# Patient Record
Sex: MALE | Race: WHITE | Employment: FULL TIME | URBAN - METROPOLITAN AREA
[De-identification: names, ages, dates, MRNs, and addresses within clinical notes are randomized per-mention and may not be internally consistent; named-entity substitution may affect disease eponyms.]

---

## 2017-01-19 RX ORDER — VALSARTAN AND HYDROCHLOROTHIAZIDE 80; 12.5 MG/1; MG/1
TABLET, FILM COATED ORAL
Qty: 90 TABLET | Refills: 0 | OUTPATIENT
Start: 2017-01-19

## 2017-01-19 NOTE — TELEPHONE ENCOUNTER
"Refill available at pharmacy.  Request refused as \"duplicate\" sent back to pharmacy.      Agustina Trejo RN        "

## 2017-01-19 NOTE — TELEPHONE ENCOUNTER
Janusz        Last Written Prescription Date: 12/19/16  Last Fill Quantity: 90, # refills: 0  [place] with [facility] or [facility] prescribing provider: 7/11/16       POTASSIUM   Date Value Ref Range Status   07/18/2016 3.7 3.4 - 5.3 mmol/L Final     CREATININE   Date Value Ref Range Status   07/11/2016 0.95 0.66 - 1.25 mg/dL Final     BP Readings from Last 3 Encounters:   07/19/16 134/84   07/15/16 142/80   07/12/16 128/78

## 2017-02-17 DIAGNOSIS — I10 HTN, GOAL BELOW 140/90: ICD-10-CM

## 2017-02-20 RX ORDER — VALSARTAN AND HYDROCHLOROTHIAZIDE 80; 12.5 MG/1; MG/1
TABLET, FILM COATED ORAL
Qty: 30 TABLET | Refills: 0 | Status: SHIPPED | OUTPATIENT
Start: 2017-02-20 | End: 2017-03-23

## 2017-02-20 NOTE — TELEPHONE ENCOUNTER
Routing refill request to provider for review/approval because:  Drug interaction warning    Sandie Moore RN  [facility]  [phone]

## 2017-03-23 DIAGNOSIS — I10 HTN, GOAL BELOW 140/90: ICD-10-CM

## 2017-03-23 RX ORDER — VALSARTAN AND HYDROCHLOROTHIAZIDE 80; 12.5 MG/1; MG/1
TABLET, FILM COATED ORAL
Qty: 30 TABLET | Refills: 0 | Status: SHIPPED | OUTPATIENT
Start: 2017-03-23 | End: 2017-04-22

## 2017-03-23 NOTE — TELEPHONE ENCOUNTER
Routing refill request to provider for review/approval because:  Drug interaction warning- patietn no showed his last bp appointment 3/10/17          diovan HCT      Last Written Prescription Date: 02/20/17  Last Fill Quantity: 30, # refills: 0  Last Office Visit with G, P or [facility] prescribing provider: 07/11/16       Potassium   Date Value Ref Range Status   07/18/2016 3.7 3.4 - 5.3 mmol/L Final     Creatinine   Date Value Ref Range Status   07/11/2016 0.95 0.66 - 1.25 mg/dL Final     BP Readings from Last 3 Encounters:   07/19/16 134/84   07/15/16 142/80   07/12/16 128/78

## 2017-03-23 NOTE — TELEPHONE ENCOUNTER
Routing to team to inform and assist in scheduling.   Sylvia Zelaya RN   [facility] - Triage

## 2017-04-22 DIAGNOSIS — I10 HTN, GOAL BELOW 140/90: ICD-10-CM

## 2017-04-24 RX ORDER — VALSARTAN AND HYDROCHLOROTHIAZIDE 80; 12.5 MG/1; MG/1
TABLET, FILM COATED ORAL
Qty: 30 TABLET | Refills: 3 | Status: SHIPPED | OUTPATIENT
Start: 2017-04-24

## 2017-04-24 NOTE — TELEPHONE ENCOUNTER
VALSARTAN-HCTZ 80-12.5 MG TAB      Last Written Prescription Date: 3/23/17  Last Fill Quantity: 30, # refills: 0  Last Office Visit with G, P or [facility] prescribing provider: 7/11/16       Potassium   Date Value Ref Range Status   07/18/2016 3.7 3.4 - 5.3 mmol/L Final     Creatinine   Date Value Ref Range Status   07/11/2016 0.95 0.66 - 1.25 mg/dL Final     BP Readings from Last 3 Encounters:   07/19/16 134/84   07/15/16 142/80   07/12/16 128/78       Sarah Severson, CMA

## 2017-04-24 NOTE — TELEPHONE ENCOUNTER
Refill approved through [facility].  Sandie Moore RN  [facility]  [phone]

## 2017-07-18 ENCOUNTER — PRE VISIT (OUTPATIENT)
Dept: CARDIOLOGY | Facility: CLINIC | Age: 57
End: 2017-07-18

## 2017-08-24 ENCOUNTER — PRE VISIT (OUTPATIENT)
Dept: CARDIOLOGY | Facility: CLINIC | Age: 57
End: 2017-08-24

## 2017-08-28 ENCOUNTER — OFFICE VISIT (OUTPATIENT)
Dept: CARDIOLOGY | Facility: CLINIC | Age: 57
End: 2017-08-28
Payer: COMMERCIAL

## 2017-08-28 ENCOUNTER — CARE COORDINATION (OUTPATIENT)
Dept: CARDIOLOGY | Facility: CLINIC | Age: 57
End: 2017-08-28

## 2017-08-28 VITALS
DIASTOLIC BLOOD PRESSURE: 102 MMHG | HEART RATE: 72 BPM | BODY MASS INDEX: 26.31 KG/M2 | WEIGHT: 183.8 LBS | SYSTOLIC BLOOD PRESSURE: 164 MMHG | HEIGHT: 70 IN

## 2017-08-28 DIAGNOSIS — I44.7 LEFT BUNDLE BRANCH BLOCK (LBBB): Primary | ICD-10-CM

## 2017-08-28 DIAGNOSIS — Z91.148 NONCOMPLIANCE WITH MEDICATION REGIMEN: ICD-10-CM

## 2017-08-28 DIAGNOSIS — I10 HTN, GOAL BELOW 140/90: Primary | ICD-10-CM

## 2017-08-28 PROCEDURE — 99214 OFFICE O/P EST MOD 30 MIN: CPT | Performed by: INTERNAL MEDICINE

## 2017-08-28 RX ORDER — IBUPROFEN 200 MG
200 TABLET ORAL PRN
COMMUNITY

## 2017-08-28 NOTE — PROGRESS NOTES
HPI and Plan:   See dictation    No orders of the defined types were placed in this encounter.      Orders Placed This Encounter   Medications     ibuprofen (ADVIL) 200 MG tablet     Sig: Take 200 mg by mouth as needed for mild pain     Acetaminophen (TYLENOL PO)     Sig: Take by mouth as needed for mild pain or fever     Fexofenadine HCl (ALLEGRA ALLERGY PO)     Sig: Take 180 mg by mouth as needed for allergies       No diagnosis found.    CURRENT MEDICATIONS:  Current Outpatient Prescriptions   Medication Sig Dispense Refill     ibuprofen (ADVIL) 200 MG tablet Take 200 mg by mouth as needed for mild pain       Acetaminophen (TYLENOL PO) Take by mouth as needed for mild pain or fever       Fexofenadine HCl (ALLEGRA ALLERGY PO) Take 180 mg by mouth as needed for allergies       calcium carbonate (TUMS) 500 MG chewable tablet Take 2 chew tab by mouth daily as needed for heartburn       glucosamine-chondroitin 500-400 MG CAPS Take 2 capsules by mouth daily       NONFORMULARY Take 1 tablet by mouth 2 times daily LIGAPLEX       valsartan-hydrochlorothiazide (DIOVAN-HCT) 80-12.5 MG per tablet TAKE 1 TABLET BY MOUTH DAILY 30 tablet 3     amLODIPine (NORVASC) 10 MG tablet TAKE ONE TABLET BY MOUTH ONE TIME DAILY 90 tablet 3       ALLERGIES     Allergies   Allergen Reactions     Aspirin Hives     Red Dye Hives     Vicodin [Hydrocodone-Acetaminophen] GI Disturbance       PAST MEDICAL HISTORY:  Past Medical History:   Diagnosis Date     Cardiomyopathy (H)      DM (diabetes mellitus) (H)      Hypercholesteremia      Hyperlipidemia      Hypertension      Left bundle branch block      Unspecified sinusitis (chronic)      Urinary calculus, unspecified     Renal stones       PAST SURGICAL HISTORY:  Past Surgical History:   Procedure Laterality Date     DECOMPRESSION, FUSION CERVICAL ANTERIOR ONE LEVEL, COMBINED N/A 7/18/2016    Procedure: COMBINED DECOMPRESSION, FUSION CERVICAL ANTERIOR ONE LEVEL;  Surgeon: Marshall Almonte,  MD;  Location: [facility] OR     HC NASAL/SINUS SCOPE W THER INSTILL  2004     HC TYMPANOPLASTY W/O MASTOID, INIT/REV W/O OSS CHAIN RECONST      Kemal naso plastic surgery     OTHER SURGICAL HISTORY  2008    sinus       FAMILY HISTORY:  Family History   Problem Relation Age of Onset     Hypertension Mother      Cardiovascular Mother      MI after 49 yo-CAD     DIABETES Father      Hypertension Father      CANCER Father      lung cancer       SOCIAL HISTORY:  Social History     Social History     Marital status:      Spouse name: Mihaela     Number of children: 1     Years of education: N/A     Occupational History     sales      Social History Main Topics     Smoking status: Current Every Day Smoker     Packs/day: 0.50     Years: 20.00     Types: Cigarettes     Smokeless tobacco: Never Used      Comment: 1/2 pack daily      Alcohol use No     Drug use: No      Comment: used marijuana     Sexual activity: Yes     Partners: Female     Birth control/ protection: Surgical     Other Topics Concern      Service No     Blood Transfusions No     Caffeine Concern No     Occupational Exposure No     Hobby Hazards No     Sleep Concern No     Stress Concern No     Weight Concern No     Special Diet Yes     low carbs, low sugar      Back Care No     Exercise No     not since neck injury (last 6 weeks) normally active hard labor working, walking, biking, golfing      Bike Helmet No     Seat Belt Yes     Self-Exams No     Social History Narrative       Review of Systems:  Skin:  Negative     Eyes:  Negative    ENT:  Negative    Respiratory:  Negative    Cardiovascular:       Gastroenterology: Negative    Genitourinary:  Negative    Musculoskeletal:  Negative    Neurologic:  Negative    Psychiatric:  Negative    Heme/Lymph/Imm:  Positive for allergies  Endocrine:  Positive for diabetes    Physical Exam:  Vitals: BP (!) 164/102 (BP Location: Right arm, Patient Position: Sitting, Cuff Size: Adult Regular)  Pulse 72  Ht 1.778 m  "(5' 10\")  Wt 83.4 kg (183 lb 12.8 oz)  BMI 26.37 kg/m2    Constitutional:  cooperative, alert and oriented, well developed, well nourished, in no acute distress        Skin:  warm and dry to the touch, no apparent skin lesions or masses noted        Head:  normocephalic, no masses or lesions        Eyes:  pupils equal and round, conjunctivae and lids unremarkable, sclera white, no xanthalasma, EOMS intact, no nystagmus        ENT:  no pallor or cyanosis, dentition good        Neck:  carotid pulses are full and equal bilaterally, JVP normal, no carotid bruit, no thyromegaly        Chest:  normal breath sounds, clear to auscultation, normal A-P diameter, normal symmetry, normal respiratory excursion, no use of accessory muscles        Cardiac: regular rhythm, normal S1/S2, no S3 or S4, apical impulse not displaced, no murmurs, gallops or rubs                  Abdomen:  abdomen soft, non-tender, BS normoactive, no mass, no HSM, no bruits        Vascular: pulses full and equal, no bruits auscultated                                      Extremities and Back:  no deformities, clubbing, cyanosis, erythema observed        Neurological:  affect appropriate, oriented to time, person and place          Recent Lab Results:  LIPID RESULTS:  Lab Results   Component Value Date    CHOL 137 05/17/2016    HDL 43 05/17/2016    LDL 71 05/17/2016    TRIG 114 05/17/2016    CHOLHDLRATIO 4.2 12/31/2014       LIVER ENZYME RESULTS:  Lab Results   Component Value Date    AST 14 05/17/2016    ALT 29 05/17/2016       CBC RESULTS:  Lab Results   Component Value Date    WBC 6.3 11/15/2012    RBC 4.83 11/15/2012    HGB 16.9 07/11/2016    HCT 45.0 11/15/2012    MCV 93 11/15/2012    MCH 32.7 11/15/2012    MCHC 35.1 11/15/2012    RDW 12.8 11/15/2012     11/15/2012       BMP RESULTS:  Lab Results   Component Value Date     07/11/2016    POTASSIUM 3.7 07/18/2016    CHLORIDE 106 07/11/2016    CO2 28 07/11/2016    ANIONGAP 5 07/11/2016    "  (H) 07/18/2016    BUN 15 07/11/2016    CR 0.95 07/11/2016    GFRESTIMATED 83 07/11/2016    GFRESTBLACK >90   GFR Calc   07/11/2016    DONALD 9.3 07/11/2016        A1C RESULTS:  Lab Results   Component Value Date    A1C 10.6 (H) 07/11/2016       INR RESULTS:  No results found for: INR        CC  No referring provider defined for this encounter.

## 2017-08-28 NOTE — LETTER
8/28/2017    [facility]    RE: Roger Garrido       Dear Colleague,    I had the pleasure of seeing Roger Garrido in the [facility].    It is a pleasure for me to see Mr. Garrido.  He is here for followup of hypertension and left bundle branch block.      I had the pleasure of seeing this gentleman for the first time last year.  He was sent in for preoperative assessment prior to cervical disk surgery as he has an EKG showing left bundle branch block.  He is diabetic.  He also has longstanding hypertension.  I am happy to say that his stress nuclear study demonstrated no perfusion abnormalities and he has normal left ventricular systolic function.  His cervical spine surgery went well.      Symptomatically, he continues to feel well.  He tells me that he played 2 sets of tennis with his 21-year-old daughter yesterday.  He has no cardiac symptoms at all and his cardiac examination is normal with the exception of his blood pressure.  When I rechecked it, I still obtained a value of 158/90.      This gentleman tells me that about 5 years ago he was diagnosed with superior mesenteric artery dissection at [facility].  He knows about the importance of risk factor control.  However, he says he stopped taking his blood pressure medications.  He does not take an aspirin either.  In fact, he is not sure what medications he is on.      He continues to smoke.     Outpatient Encounter Prescriptions as of 8/28/2017   Medication Sig Dispense Refill     ibuprofen (ADVIL) 200 MG tablet Take 200 mg by mouth as needed for mild pain       Acetaminophen (TYLENOL PO) Take by mouth as needed for mild pain or fever       Fexofenadine HCl (ALLEGRA ALLERGY PO) Take 180 mg by mouth as needed for allergies       calcium carbonate (TUMS) 500 MG chewable tablet Take 2 chew tab by mouth daily as needed for heartburn       glucosamine-chondroitin 500-400 MG CAPS Take 2 capsules by  mouth daily       NONFORMULARY Take 1 tablet by mouth 2 times daily LIGAPLEX       valsartan-hydrochlorothiazide (DIOVAN-HCT) 80-12.5 MG per tablet TAKE 1 TABLET BY MOUTH DAILY 30 tablet 3     [DISCONTINUED] amLODIPine (NORVASC) 10 MG tablet TAKE ONE TABLET BY MOUTH ONE TIME DAILY 90 tablet 3     [DISCONTINUED] oxyCODONE (ROXICODONE) 5 MG immediate release tablet Take 1-2 tablets (5-10 mg) by mouth every 4 hours as needed for moderate to severe pain 60 tablet 0     [DISCONTINUED] diazepam (VALIUM) 5 MG tablet Take 1 tablet (5 mg) by mouth every 8 hours as needed for other (muscle spasms) 30 tablet 0     [DISCONTINUED] METFORMIN HCL PO Take 1,000 mg by mouth 2 times daily (with meals)       [DISCONTINUED] COENZYME Q-10 PO Take 30 mg by mouth daily       [DISCONTINUED] atorvastatin (LIPITOR) 10 MG tablet Take 1 tablet (10 mg) by mouth daily 90 tablet 0     No facility-administered encounter medications on file as of 8/28/2017.       IMPRESSION:   1.  Left bundle branch block on EKG.  Likely due to longstanding hypertension.   2.  Suboptimal blood pressure control.   3.  Diabetes mellitus.  HbA1c of last year was 10.6 and he has not had it rechecked since then.   4.  Dyslipidemia.  No longer taking a statin.   5.  Tobacco use.   6.  Medication noncompliance.   7.  History of superior mesenteric artery dissection.      I am happy to see that this gentleman had no symptoms and has good exercise tolerance.  However, I emphasized the importance of looking after himself, especially with his cardiac risk factor profile.  I asked him to start taking a baby aspirin.  I explained to him the importance of blood pressure control as well as tobacco cessation.  He appears to understand.  I asked him to go home and look at the medication list that he is on and call us back.  As he has no primary care physician at this time, I will be happy to renew his blood pressure medications as well as his statin.  I have asked him to come back  and see me in 3 months' time for further followup.     Again, thank you for allowing me to participate in the care of your patient.      Sincerely,    DR MARY LOU CHOW MD     [facility]

## 2017-08-28 NOTE — PROGRESS NOTES
HISTORY OF PRESENT ILLNESS:  It is a pleasure for me to see Mr. Garrido.  He is here for followup of hypertension and left bundle branch block.      I had the pleasure of seeing this gentleman for the first time last year.  He was sent in for preoperative assessment prior to cervical disk surgery as he has an EKG showing left bundle branch block.  He is diabetic.  He also has longstanding hypertension.  I am happy to say that his stress nuclear study demonstrated no perfusion abnormalities and he has normal left ventricular systolic function.  His cervical spine surgery went well.      Symptomatically, he continues to feel well.  He tells me that he played 2 sets of tennis with his 21-year-old daughter yesterday.  He has no cardiac symptoms at all and his cardiac examination is normal with the exception of his blood pressure.  When I rechecked it, I still obtained a value of 158/90.      This gentleman tells me that about 5 years ago he was diagnosed with superior mesenteric artery dissection at [facility].  He knows about the importance of risk factor control.  However, he says he stopped taking his blood pressure medications.  He does not take an aspirin either.  In fact, he is not sure what medications he is on.      He continues to smoke.      IMPRESSION:   1.  Left bundle branch block on EKG.  Likely due to longstanding hypertension.   2.  Suboptimal blood pressure control.   3.  Diabetes mellitus.  HbA1c of last year was 10.6 and he has not had it rechecked since then.   4.  Dyslipidemia.  No longer taking a statin.   5.  Tobacco use.   6.  Medication noncompliance.   7.  History of superior mesenteric artery dissection.      I am happy to see that this gentleman had no symptoms and has good exercise tolerance.  However, I emphasized the importance of looking after himself, especially with his cardiac risk factor profile.  I asked him to start taking a baby aspirin.  I explained to him the  importance of blood pressure control as well as tobacco cessation.  He appears to understand.  I asked him to go home and look at the medication list that he is on and call us back.  As he has no primary care physician at this time, I will be happy to renew his blood pressure medications as well as his statin.  I have asked him to come back and see me in 3 months' time for further followup.         MARY LOU CHOW MD, [facility]             D: 2017 08:31   T: 2017 09:28   MT: ROSIO      Name:     YO GARCIA   MRN:      [id number]        Account:      [id number]   :      1960           Service Date: 2017      Document: [id number]

## 2017-08-28 NOTE — MR AVS SNAPSHOT
"              After Visit Summary   8/28/2017    Roger Garrido    MRN: [id number]           Patient Information     Date Of Birth          1960        Visit Information        Provider Department      8/28/2017 7:45 AM Abimael Holman MD [facility]        Today's Diagnoses     Left bundle branch block (LBBB)    -  1    Noncompliance with medication regimen           Follow-ups after your visit        Additional Services     Follow-Up with Cardiologist                 Future tests that were ordered for you today     Open Future Orders        Priority Expected Expires Ordered    Follow-Up with Cardiologist Routine 11/26/2017 8/28/2018 8/28/2017            Who to contact     If you have questions or need follow up information about today's clinic visit or your schedule please contact [facility] directly at [phone].  Normal or non-critical lab and imaging results will be communicated to you by [employer], letter or phone within 4 business days after the clinic has received the results. If you do not hear from us within 7 days, please contact the clinic through [employer] or phone. If you have a critical or abnormal lab result, we will notify you by phone as soon as possible.  Submit refill requests through [employer] or call your pharmacy and they will forward the refill request to us. Please allow 3 business days for your refill to be completed.          Additional Information About Your Visit        MyChart Information     [employer] lets you send messages to your doctor, view your test results, renew your prescriptions, schedule appointments and more. To sign up, go to [email or web address] . Click on \"Log in\" on the left side of the screen, which will take you to the Welcome page. Then click on \"Sign up Now\" on the right side of the page.     You will be asked to enter the access code listed below, as well as some personal " "information. Please follow the directions to create your username and password.     Your access code is: [email or web address]  Expires: 2017  8:32 AM     Your access code will  in 90 days. If you need help or a new code, please call your [facility] clinic or [phone].        Care EveryWhere ID     This is your Care EveryWhere ID. This could be used by other organizations to access your [facility] medical records  [id number]        Your Vitals Were     Pulse Height BMI (Body Mass Index)             72 1.778 m (5' 10\") 26.37 kg/m2          Blood Pressure from Last 3 Encounters:   17 (!) 164/102   16 134/84   07/15/16 142/80    Weight from Last 3 Encounters:   17 83.4 kg (183 lb 12.8 oz)   16 78.6 kg (173 lb 3.2 oz)   16 81.1 kg (178 lb 12.8 oz)               Primary Care Provider Office Phone #    [facility] [phone]       No address on file        Equal Access to Services     SINAI TRINIDAD : Hadii crow ku hadasho Soomaali, waaxda luqadaha, qaybta kaalmada adeegyada, teresa serrano . So [facility] [phone].    ATENCIÓN: Si habla español, tiene a oneal disposición servicios gratuitos de asistencia lingüística. Llame al [phone].    We comply with applicable federal civil rights laws and [place] laws. We do not discriminate on the basis of race, color, national origin, age, disability sex, sexual orientation or gender identity.            Thank you!     Thank you for choosing [facility] [place]  for your care. Our goal is always to provide you with excellent care. Hearing back from our patients is one way we can continue to improve our services. Please take a few minutes to complete the written survey that you may receive in the mail after your visit with us. Thank you!             Your Updated Medication List - Protect others around you: Learn how to safely use, store and throw away your medicines at " www.disposemymeds.org.          This list is accurate as of: 8/28/17  8:32 AM.  Always use your most recent med list.                   Brand Name Dispense Instructions for use Diagnosis    ADVIL 200 MG tablet   Generic drug:  ibuprofen      Take 200 mg by mouth as needed for mild pain        ALLEGRA ALLERGY PO      Take 180 mg by mouth as needed for allergies        amLODIPine 10 MG tablet    NORVASC    90 tablet    TAKE ONE TABLET BY MOUTH ONE TIME DAILY    Essential hypertension with goal blood pressure less than 140/90       calcium carbonate 500 MG chewable tablet    TUMS     Take 2 chew tab by mouth daily as needed for heartburn        glucosamine-chondroitin 500-400 MG Caps per capsule      Take 2 capsules by mouth daily        NONFORMULARY      Take 1 tablet by mouth 2 times daily LIGAPLEX        TYLENOL PO      Take by mouth as needed for mild pain or fever        valsartan-hydrochlorothiazide 80-12.5 MG per tablet    DIOVAN-HCT    30 tablet    TAKE 1 TABLET BY MOUTH DAILY    HTN, goal below 140/90

## 2017-08-28 NOTE — PROGRESS NOTES
"Received message from  staff that pt called back with his list of meds after seeing  8/28/17. Called pt, no answer, left  for callback.    Pt called back, is taking correct dose of valsartan-HCTZ 80-12.5mg, 1 tablet daily. Pt has amlodipine on med list, however is not taking amlodipine and hasn't \"for a while\"; removed from med list.   Routing to Dr. Holman as CHRISTINA Maya RN      "

## 2017-08-29 NOTE — PROGRESS NOTES
Called pt to advise per Dr. Holman, pt should restart amlodipine 5mg daily. No answer, left vm for callback. Will wait to send script until I can confirm where to send it.   Zulma LLANOS      Update - called pt again, understood. Sent script for amlodipine 5mg daily to [facility] per pt request.   Zulma LLANOS

## 2017-09-01 DIAGNOSIS — I10 HTN, GOAL BELOW 140/90: ICD-10-CM

## 2017-09-01 RX ORDER — AMLODIPINE BESYLATE 5 MG/1
5 TABLET ORAL DAILY
Qty: 60 TABLET | Refills: 3 | Status: SHIPPED | OUTPATIENT
Start: 2017-09-01 | End: 2018-05-02

## 2017-09-01 NOTE — TELEPHONE ENCOUNTER
Can you please ask the cardiologist? His blood pressure was too high, wondering if they were going to adjust this?  Abbey Mohan, JUAQUIN, PA-C

## 2017-09-01 NOTE — TELEPHONE ENCOUNTER
Per cardiac notes should patient be following with cardiology for further refills of BP meds?   I am happy to see that this gentleman had no symptoms and has good exercise tolerance.  However, I emphasized the importance of looking after himself, especially with his cardiac risk factor profile.  I asked him to start taking a baby aspirin.  I explained to him the importance of blood pressure control as well as tobacco cessation.  He appears to understand.  I asked him to go home and look at the medication list that he is on and call us back.  As he has no primary care physician at this time, I will be happy to renew his blood pressure medications as well as his statin.  I have asked him to come back and see me in 3 months' time for further followup.   Rosemarie Hansen RN - Triage  [facility]

## 2017-09-01 NOTE — TELEPHONE ENCOUNTER
Valsartan/hctz      Last Written Prescription Date: 4/24/2017  Last Fill Quantity: 30, # refills: 3  Last Office Visit with G, P or [facility] prescribing provider: 4/7/2017       Potassium   Date Value Ref Range Status   07/18/2016 3.7 3.4 - 5.3 mmol/L Final     Creatinine   Date Value Ref Range Status   07/11/2016 0.95 0.66 - 1.25 mg/dL Final     BP Readings from Last 3 Encounters:   08/28/17 (!) 164/102   07/19/16 134/84   07/15/16 142/80

## 2017-09-05 ENCOUNTER — TELEPHONE (OUTPATIENT)
Dept: CARDIOLOGY | Facility: CLINIC | Age: 57
End: 2017-09-05

## 2017-09-05 NOTE — TELEPHONE ENCOUNTER
Message    RN task Received: 4 days ago       Ip, MD SHAHEEN Clemente [facility] Team 3       Caller: Unspecified (4 days ago, 12:36 AM)                     Pls let them know I already addressed his BP meds for now, he has follow up appointment with me.  Thanks.       Patient started on amlodipine 5 mg daily on 8/29/17 for further blood pressure control. He will return to clinic for a follow up visit in November. CASE Ingram RN

## 2018-05-02 DIAGNOSIS — I10 HTN, GOAL BELOW 140/90: ICD-10-CM

## 2018-05-02 RX ORDER — AMLODIPINE BESYLATE 5 MG/1
5 TABLET ORAL DAILY
Qty: 90 TABLET | Refills: 0 | Status: SHIPPED | OUTPATIENT
Start: 2018-05-02

## 2018-06-01 ENCOUNTER — TELEPHONE (OUTPATIENT)
Dept: CARDIOLOGY | Facility: CLINIC | Age: 58
End: 2018-06-01

## 2018-06-01 RX ORDER — VALSARTAN AND HYDROCHLOROTHIAZIDE 80; 12.5 MG/1; MG/1
TABLET, FILM COATED ORAL
Qty: 30 TABLET | Refills: 3 | OUTPATIENT
Start: 2018-06-01

## 2018-06-01 NOTE — TELEPHONE ENCOUNTER
Refill request per EPIC e-prescription for valsartan  Refused by PCP.  Pt seen 08/2017 by Dr. Holman with request for follow up in Nov 2017- pt did not make follow up appt.  Left message for pt to call back to discuss.

## 2019-03-13 ENCOUNTER — OFFICE VISIT (OUTPATIENT)
Dept: CARDIOLOGY | Facility: CLINIC | Age: 59
End: 2019-03-13
Payer: COMMERCIAL

## 2019-03-13 VITALS
HEART RATE: 76 BPM | HEIGHT: 70 IN | WEIGHT: 181.2 LBS | DIASTOLIC BLOOD PRESSURE: 80 MMHG | SYSTOLIC BLOOD PRESSURE: 134 MMHG | BODY MASS INDEX: 25.94 KG/M2

## 2019-03-13 DIAGNOSIS — I10 HTN, GOAL BELOW 140/90: Primary | ICD-10-CM

## 2019-03-13 DIAGNOSIS — I44.7 LEFT BUNDLE BRANCH BLOCK (LBBB): ICD-10-CM

## 2019-03-13 PROCEDURE — 99214 OFFICE O/P EST MOD 30 MIN: CPT | Performed by: INTERNAL MEDICINE

## 2019-03-13 ASSESSMENT — MIFFLIN-ST. JEOR: SCORE: 1658.17

## 2019-03-13 NOTE — PROGRESS NOTES
Service Date: 03/13/2019      HISTORY OF PRESENT ILLNESS:  It is a pleasure to follow up with Mr. Garrido.  He has history of chronic left bundle branch block on EKG as well as multiple cardiac risk factors such as diabetes, hypertension.  In the year and a half since I have last seen him, he tells me that he had relocated to [place] briefly to work for [employer] but unfortunately, his job was eliminated and he is now back in [place].        He continues to have symptoms affecting his left side.  He tells me that he has continued numbness and tingling in his left forearm and left hand.  There is no loss of motor function.  He has no chest pain, shortness of breath or any other heart failure symptoms.  He denies dizzy spells, syncopal episodes.  He has been told that his symptoms could be from a CVA or from cervical spine disease which he also has.  He was told his brain MR is normal.  He is now being worked up for carpal tunnel syndrome.  He has a history of superior mesenteric artery dissection, but he has very minimal digestive symptoms.  He denies nausea or vomiting.  He has an occasional right-sided abdominal pain which is not bothersome.      Physical examination is unremarkable.  Initial blood pressure was a little high, but when I rechecked it, it was 134/80.  He does tell me that blood pressures are around 129/67 at home.      He has almost given up smoking.      IMPRESSION:   1.  Chronic left bundle branch block on EKG, likely due to longstanding hypertension.   2.  Probably adequate blood pressure control.   3.  Tobacco use, giving up.   4.  Diabetes mellitus.  Control is improved.  He tells me that his HbA1c is 7.2.  When I saw him in 2017, it was 10.6.   5.  History of superior mesenteric artery dissection.   6.  Cervical spine disease.   7.  Possible carpal tunnel syndrome.      Overall, he is stable.  I reassured him that in the absence of symptoms referable to the heart and also unremarkable cardiac  exam it would be fine to defer further imaging at this time.  I will see him again in a year's time for further followup.         MARY LOU CHOW MD, [facility]             D: 2019   T: 2019   MT: ROSIO      Name:     YO GARCIA   MRN:      [id number]        Account:      [id number]   :      1960           Service Date: 2019      Document: [id number]

## 2019-03-13 NOTE — PROGRESS NOTES
HPI and Plan:   See dictation    Orders Placed This Encounter   Procedures     Follow-Up with Cardiologist       Orders Placed This Encounter   Medications     metFORMIN (GLUCOPHAGE) 500 MG tablet     Sig: Take 1,000 mg by mouth daily (with breakfast)      aspirin (ASA) 81 MG tablet     Sig: Take 81 mg by mouth daily       Encounter Diagnoses   Name Primary?     HTN, goal below 140/90 Yes     Left bundle branch block (LBBB)        CURRENT MEDICATIONS:  Current Outpatient Medications   Medication Sig Dispense Refill     Acetaminophen (TYLENOL PO) Take by mouth as needed for mild pain or fever       amLODIPine (NORVASC) 5 MG tablet Take 1 tablet (5 mg) by mouth daily 90 tablet 0     aspirin (ASA) 81 MG tablet Take 81 mg by mouth daily       calcium carbonate (TUMS) 500 MG chewable tablet Take 2 chew tab by mouth daily as needed for heartburn       Fexofenadine HCl (ALLEGRA ALLERGY PO) Take 180 mg by mouth as needed for allergies       glucosamine-chondroitin 500-400 MG CAPS Take 2 capsules by mouth daily       ibuprofen (ADVIL) 200 MG tablet Take 200 mg by mouth as needed for mild pain       metFORMIN (GLUCOPHAGE) 500 MG tablet Take 1,000 mg by mouth daily (with breakfast)        valsartan-hydrochlorothiazide (DIOVAN-HCT) 80-12.5 MG per tablet TAKE 1 TABLET BY MOUTH DAILY 30 tablet 3     NONFORMULARY Take 1 tablet by mouth 2 times daily LIGAPLEX         ALLERGIES     Allergies   Allergen Reactions     Aspirin Hives     Red Dye Hives     Vicodin [Hydrocodone-Acetaminophen] GI Disturbance       PAST MEDICAL HISTORY:  Past Medical History:   Diagnosis Date     Cardiomyopathy (H)      DM (diabetes mellitus) (H)      Hypercholesteremia      Hyperlipidemia      Hypertension      Left bundle branch block      Unspecified sinusitis (chronic)      Urinary calculus, unspecified     Renal stones       PAST SURGICAL HISTORY:  Past Surgical History:   Procedure Laterality Date     DECOMPRESSION, FUSION CERVICAL ANTERIOR ONE LEVEL,  COMBINED N/A 7/18/2016    Procedure: COMBINED DECOMPRESSION, FUSION CERVICAL ANTERIOR ONE LEVEL;  Surgeon: Marshall Almonte MD;  Location: [facility] OR     HC NASAL/SINUS SCOPE W THER INSTILL  2004     HC TYMPANOPLASTY W/O MASTOID, INIT/REV W/O OSS CHAIN RECONST      Kemal naso plastic surgery     OTHER SURGICAL HISTORY  2008    sinus       FAMILY HISTORY:  Family History   Problem Relation Age of Onset     Hypertension Mother      Cardiovascular Mother         MI after 49 yo-CAD     Diabetes Father      Hypertension Father      Cancer Father         lung cancer       SOCIAL HISTORY:  Social History     Socioeconomic History     Marital status:      Spouse name: Mihaela     Number of children: 1     Years of education: None     Highest education level: None   Occupational History     Occupation: sales   Social Needs     Financial resource strain: None     Food insecurity:     Worry: None     Inability: None     Transportation needs:     Medical: None     Non-medical: None   Tobacco Use     Smoking status: Current Every Day Smoker     Packs/day: 0.25     Years: 20.00     Pack years: 5.00     Types: Cigarettes     Smokeless tobacco: Never Used     Tobacco comment: .25 pack per day   Substance and Sexual Activity     Alcohol use: No     Alcohol/week: 0.0 oz     Drug use: No     Comment: used marijuana     Sexual activity: Yes     Partners: Female     Birth control/protection: Surgical   Lifestyle     Physical activity:     Days per week: None     Minutes per session: None     Stress: None   Relationships     Social connections:     Talks on phone: None     Gets together: None     Attends Anabaptism service: None     Active member of club or organization: None     Attends meetings of clubs or organizations: None     Relationship status: None     Intimate partner violence:     Fear of current or ex partner: None     Emotionally abused: None     Physically abused: None     Forced sexual activity: None   Other Topics  "Concern      Service No     Blood Transfusions No     Caffeine Concern No     Occupational Exposure No     Hobby Hazards No     Sleep Concern No     Stress Concern No     Weight Concern No     Special Diet Yes     Comment: low carbs, low sugar      Back Care No     Exercise No     Comment: not since neck injury (last 6 weeks) normally active hard labor working, walking, biking, golfing      Bike Helmet No     Seat Belt Yes     Self-Exams No     Parent/sibling w/ CABG, MI or angioplasty before 65F 55M? Not Asked   Social History Narrative     None       Review of Systems:  Skin:  Negative     Eyes:  Negative    ENT:  Negative    Respiratory:  Negative    Cardiovascular:  Negative;palpitations;chest pain;lightheadedness;dizziness;syncope or near-syncope;cyanosis;fatigue;exercise intolerance;edema    Gastroenterology: Negative    Genitourinary:  not assessed    Musculoskeletal:  Negative    Neurologic:  Negative    Psychiatric:  Negative    Heme/Lymph/Imm:  Positive for allergies  Endocrine:  Positive for diabetes    Physical Exam:  Vitals: /80   Pulse 76   Ht 1.778 m (5' 10\")   Wt 82.2 kg (181 lb 3.2 oz)   BMI 26.00 kg/m      Constitutional:  cooperative, alert and oriented, well developed, well nourished, in no acute distress        Skin:  warm and dry to the touch, no apparent skin lesions or masses noted          Head:  normocephalic, no masses or lesions        Eyes:  pupils equal and round, conjunctivae and lids unremarkable, sclera white, no xanthalasma, EOMS intact, no nystagmus        Lymph:      ENT:  no pallor or cyanosis, dentition good        Neck:  carotid pulses are full and equal bilaterally, JVP normal, no carotid bruit        Respiratory:  normal breath sounds, clear to auscultation, normal A-P diameter, normal symmetry, normal respiratory excursion, no use of accessory muscles         Cardiac: regular rhythm, normal S1/S2, no S3 or S4, apical impulse not displaced, no murmurs, " gallops or rubs                pulses full and equal, no bruits auscultated                                        GI:  abdomen soft, non-tender, BS normoactive, no mass, no HSM, no bruits        Extremities and Muscular Skeletal:  no deformities, clubbing, cyanosis, erythema observed              Neurological:  no gross motor deficits        Psych:  Alert and Oriented x 3        Recent Lab Results:  LIPID RESULTS:  Lab Results   Component Value Date    CHOL 137 05/17/2016    HDL 43 05/17/2016    LDL 71 05/17/2016    TRIG 114 05/17/2016    CHOLHDLRATIO 4.2 12/31/2014       LIVER ENZYME RESULTS:  Lab Results   Component Value Date    AST 14 05/17/2016    ALT 29 05/17/2016       CBC RESULTS:  Lab Results   Component Value Date    WBC 6.3 11/15/2012    RBC 4.83 11/15/2012    HGB 16.9 07/11/2016    HCT 45.0 11/15/2012    MCV 93 11/15/2012    MCH 32.7 11/15/2012    MCHC 35.1 11/15/2012    RDW 12.8 11/15/2012     11/15/2012       BMP RESULTS:  Lab Results   Component Value Date     07/11/2016    POTASSIUM 3.7 07/18/2016    CHLORIDE 106 07/11/2016    CO2 28 07/11/2016    ANIONGAP 5 07/11/2016     (H) 07/18/2016    BUN 15 07/11/2016    CR 0.95 07/11/2016    GFRESTIMATED 83 07/11/2016    GFRESTBLACK >90   GFR Calc   07/11/2016    DONALD 9.3 07/11/2016        A1C RESULTS:  Lab Results   Component Value Date    A1C 10.6 (H) 07/11/2016       INR RESULTS:  No results found for: INR        CC  No referring provider defined for this encounter.

## 2019-03-13 NOTE — LETTER
3/13/2019    Derrick Aldana MD  [facility] [address]  [facility] [address]    RE: Roger Garrido       Dear Colleague,    I had the pleasure of seeing Roger Garrido in the [facility].    HPI and Plan:   See dictation    Orders Placed This Encounter   Procedures     Follow-Up with Cardiologist       Orders Placed This Encounter   Medications     metFORMIN (GLUCOPHAGE) 500 MG tablet     Sig: Take 1,000 mg by mouth daily (with breakfast)      aspirin (ASA) 81 MG tablet     Sig: Take 81 mg by mouth daily       Encounter Diagnoses   Name Primary?     HTN, goal below 140/90 Yes     Left bundle branch block (LBBB)        CURRENT MEDICATIONS:  Current Outpatient Medications   Medication Sig Dispense Refill     Acetaminophen (TYLENOL PO) Take by mouth as needed for mild pain or fever       amLODIPine (NORVASC) 5 MG tablet Take 1 tablet (5 mg) by mouth daily 90 tablet 0     aspirin (ASA) 81 MG tablet Take 81 mg by mouth daily       calcium carbonate (TUMS) 500 MG chewable tablet Take 2 chew tab by mouth daily as needed for heartburn       Fexofenadine HCl (ALLEGRA ALLERGY PO) Take 180 mg by mouth as needed for allergies       glucosamine-chondroitin 500-400 MG CAPS Take 2 capsules by mouth daily       ibuprofen (ADVIL) 200 MG tablet Take 200 mg by mouth as needed for mild pain       metFORMIN (GLUCOPHAGE) 500 MG tablet Take 1,000 mg by mouth daily (with breakfast)        valsartan-hydrochlorothiazide (DIOVAN-HCT) 80-12.5 MG per tablet TAKE 1 TABLET BY MOUTH DAILY 30 tablet 3     NONFORMULARY Take 1 tablet by mouth 2 times daily LIGAPLEX         ALLERGIES     Allergies   Allergen Reactions     Aspirin Hives     Red Dye Hives     Vicodin [Hydrocodone-Acetaminophen] GI Disturbance       PAST MEDICAL HISTORY:  Past Medical History:   Diagnosis Date     Cardiomyopathy (H)      DM (diabetes mellitus) (H)      Hypercholesteremia      Hyperlipidemia      Hypertension      Left bundle branch block       Unspecified sinusitis (chronic)      Urinary calculus, unspecified     Renal stones       PAST SURGICAL HISTORY:  Past Surgical History:   Procedure Laterality Date     DECOMPRESSION, FUSION CERVICAL ANTERIOR ONE LEVEL, COMBINED N/A 7/18/2016    Procedure: COMBINED DECOMPRESSION, FUSION CERVICAL ANTERIOR ONE LEVEL;  Surgeon: Marshall Almonte MD;  Location: [facility] OR     HC NASAL/SINUS SCOPE W THER INSTILL  2004     HC TYMPANOPLASTY W/O MASTOID, INIT/REV W/O OSS CHAIN RECONST      Kemal naso plastic surgery     OTHER SURGICAL HISTORY  2008    sinus       FAMILY HISTORY:  Family History   Problem Relation Age of Onset     Hypertension Mother      Cardiovascular Mother         MI after 49 yo-CAD     Diabetes Father      Hypertension Father      Cancer Father         lung cancer       SOCIAL HISTORY:  Social History     Socioeconomic History     Marital status:      Spouse name: Mihaela     Number of children: 1     Years of education: None     Highest education level: None   Occupational History     Occupation: sales   Social Needs     Financial resource strain: None     Food insecurity:     Worry: None     Inability: None     Transportation needs:     Medical: None     Non-medical: None   Tobacco Use     Smoking status: Current Every Day Smoker     Packs/day: 0.25     Years: 20.00     Pack years: 5.00     Types: Cigarettes     Smokeless tobacco: Never Used     Tobacco comment: .25 pack per day   Substance and Sexual Activity     Alcohol use: No     Alcohol/week: 0.0 oz     Drug use: No     Comment: used marijuana     Sexual activity: Yes     Partners: Female     Birth control/protection: Surgical   Lifestyle     Physical activity:     Days per week: None     Minutes per session: None     Stress: None   Relationships     Social connections:     Talks on phone: None     Gets together: None     Attends Catholic service: None     Active member of club or organization: None     Attends meetings of clubs or  "organizations: None     Relationship status: None     Intimate partner violence:     Fear of current or ex partner: None     Emotionally abused: None     Physically abused: None     Forced sexual activity: None   Other Topics Concern      Service No     Blood Transfusions No     Caffeine Concern No     Occupational Exposure No     Hobby Hazards No     Sleep Concern No     Stress Concern No     Weight Concern No     Special Diet Yes     Comment: low carbs, low sugar      Back Care No     Exercise No     Comment: not since neck injury (last 6 weeks) normally active hard labor working, walking, biking, golfing      Bike Helmet No     Seat Belt Yes     Self-Exams No     Parent/sibling w/ CABG, MI or angioplasty before 65F 55M? Not Asked   Social History Narrative     None       Review of Systems:  Skin:  Negative     Eyes:  Negative    ENT:  Negative    Respiratory:  Negative    Cardiovascular:  Negative;palpitations;chest pain;lightheadedness;dizziness;syncope or near-syncope;cyanosis;fatigue;exercise intolerance;edema    Gastroenterology: Negative    Genitourinary:  not assessed    Musculoskeletal:  Negative    Neurologic:  Negative    Psychiatric:  Negative    Heme/Lymph/Imm:  Positive for allergies  Endocrine:  Positive for diabetes    Physical Exam:  Vitals: /80   Pulse 76   Ht 1.778 m (5' 10\")   Wt 82.2 kg (181 lb 3.2 oz)   BMI 26.00 kg/m       Constitutional:  cooperative, alert and oriented, well developed, well nourished, in no acute distress        Skin:  warm and dry to the touch, no apparent skin lesions or masses noted          Head:  normocephalic, no masses or lesions        Eyes:  pupils equal and round, conjunctivae and lids unremarkable, sclera white, no xanthalasma, EOMS intact, no nystagmus        Lymph:      ENT:  no pallor or cyanosis, dentition good        Neck:  carotid pulses are full and equal bilaterally, JVP normal, no carotid bruit        Respiratory:  normal breath sounds, " clear to auscultation, normal A-P diameter, normal symmetry, normal respiratory excursion, no use of accessory muscles         Cardiac: regular rhythm, normal S1/S2, no S3 or S4, apical impulse not displaced, no murmurs, gallops or rubs                pulses full and equal, no bruits auscultated                                        GI:  abdomen soft, non-tender, BS normoactive, no mass, no HSM, no bruits        Extremities and Muscular Skeletal:  no deformities, clubbing, cyanosis, erythema observed              Neurological:  no gross motor deficits        Psych:  Alert and Oriented x 3        Recent Lab Results:  LIPID RESULTS:  Lab Results   Component Value Date    CHOL 137 05/17/2016    HDL 43 05/17/2016    LDL 71 05/17/2016    TRIG 114 05/17/2016    CHOLHDLRATIO 4.2 12/31/2014       LIVER ENZYME RESULTS:  Lab Results   Component Value Date    AST 14 05/17/2016    ALT 29 05/17/2016       CBC RESULTS:  Lab Results   Component Value Date    WBC 6.3 11/15/2012    RBC 4.83 11/15/2012    HGB 16.9 07/11/2016    HCT 45.0 11/15/2012    MCV 93 11/15/2012    MCH 32.7 11/15/2012    MCHC 35.1 11/15/2012    RDW 12.8 11/15/2012     11/15/2012       BMP RESULTS:  Lab Results   Component Value Date     07/11/2016    POTASSIUM 3.7 07/18/2016    CHLORIDE 106 07/11/2016    CO2 28 07/11/2016    ANIONGAP 5 07/11/2016     (H) 07/18/2016    BUN 15 07/11/2016    CR 0.95 07/11/2016    GFRESTIMATED 83 07/11/2016    GFRESTBLACK >90   GFR Calc   07/11/2016    DONALD 9.3 07/11/2016        A1C RESULTS:  Lab Results   Component Value Date    A1C 10.6 (H) 07/11/2016       INR RESULTS:  No results found for: INR        CC  No referring provider defined for this encounter.                  Thank you for allowing me to participate in the care of your patient.      Sincerely,     DR MARY LOU CHOW MD     [facility]    cc:   No referring provider defined for this encounter.

## 2019-03-13 NOTE — LETTER
3/13/2019      eDrrick Aldana MD  [facility]  [address]  [facility] [address]      RE: Roger Garrido       Dear Colleague,    I had the pleasure of seeing Roger Garrido in the [facility].    Service Date: 03/13/2019      HISTORY OF PRESENT ILLNESS:  It is a pleasure to follow up with Mr. Garrido.  He has history of chronic left bundle branch block on EKG as well as multiple cardiac risk factors such as diabetes, hypertension.  In the year and a half since I have last seen him, he tells me that he had relocated to [place] briefly to work for [employer] but unfortunately, his job was eliminated and he is now back in [place].        He continues to have symptoms affecting his left side.  He tells me that he has continued numbness and tingling in his left forearm and left hand.  There is no loss of motor function.  He has no chest pain, shortness of breath or any other heart failure symptoms.  He denies dizzy spells, syncopal episodes.  He has been told that his symptoms could be from a CVA or from cervical spine disease which he also has.  He was told his brain MR is normal.  He is now being worked up for carpal tunnel syndrome.  He has a history of superior mesenteric artery dissection, but he has very minimal digestive symptoms.  He denies nausea or vomiting.  He has an occasional right-sided abdominal pain which is not bothersome.      Physical examination is unremarkable.  Initial blood pressure was a little high, but when I rechecked it, it was 134/80.  He does tell me that blood pressures are around 129/67 at home.      He has almost given up smoking.      IMPRESSION:   1.  Chronic left bundle branch block on EKG, likely due to longstanding hypertension.   2.  Probably adequate blood pressure control.   3.  Tobacco use, giving up.   4.  Diabetes mellitus.  Control is improved.  He tells me that his HbA1c is 7.2.  When I saw him in 2017, it was 10.6.   5.  History of superior  mesenteric artery dissection.   6.  Cervical spine disease.   7.  Possible carpal tunnel syndrome.      Overall, he is stable.  I reassured him that in the absence of symptoms referable to the heart and also unremarkable cardiac exam it would be fine to defer further imaging at this time.  I will see him again in a year's time for further followup.         MARY LOU CHOW MD, [facility]             D: 2019   T: 2019   MT:       Name:     YO GARCIA   MRN:      [id number]        Account:      [id number]   :      1960           Service Date: 2019      Document: [id number]           Outpatient Encounter Medications as of 3/13/2019   Medication Sig Dispense Refill     Acetaminophen (TYLENOL PO) Take by mouth as needed for mild pain or fever       amLODIPine (NORVASC) 5 MG tablet Take 1 tablet (5 mg) by mouth daily 90 tablet 0     aspirin (ASA) 81 MG tablet Take 81 mg by mouth daily       calcium carbonate (TUMS) 500 MG chewable tablet Take 2 chew tab by mouth daily as needed for heartburn       Fexofenadine HCl (ALLEGRA ALLERGY PO) Take 180 mg by mouth as needed for allergies       glucosamine-chondroitin 500-400 MG CAPS Take 2 capsules by mouth daily       ibuprofen (ADVIL) 200 MG tablet Take 200 mg by mouth as needed for mild pain       metFORMIN (GLUCOPHAGE) 500 MG tablet Take 1,000 mg by mouth daily (with breakfast)        valsartan-hydrochlorothiazide (DIOVAN-HCT) 80-12.5 MG per tablet TAKE 1 TABLET BY MOUTH DAILY 30 tablet 3     NONFORMULARY Take 1 tablet by mouth 2 times daily LIGAPLEX       No facility-administered encounter medications on file as of 3/13/2019.        Again, thank you for allowing me to participate in the care of your patient.      Sincerely,    DR MARY LOU CHOW MD     [facility]